# Patient Record
Sex: FEMALE | Race: WHITE | Employment: FULL TIME | URBAN - METROPOLITAN AREA
[De-identification: names, ages, dates, MRNs, and addresses within clinical notes are randomized per-mention and may not be internally consistent; named-entity substitution may affect disease eponyms.]

---

## 2024-08-05 ENCOUNTER — OFFICE VISIT (OUTPATIENT)
Dept: ORTHOPEDIC SURGERY | Age: 24
End: 2024-08-05
Payer: COMMERCIAL

## 2024-08-05 VITALS — WEIGHT: 200 LBS | HEIGHT: 63 IN | BODY MASS INDEX: 35.44 KG/M2

## 2024-08-05 DIAGNOSIS — M25.561 RIGHT KNEE PAIN, UNSPECIFIED CHRONICITY: Primary | ICD-10-CM

## 2024-08-05 PROCEDURE — 99204 OFFICE O/P NEW MOD 45 MIN: CPT | Performed by: ORTHOPAEDIC SURGERY

## 2024-08-11 ENCOUNTER — HOSPITAL ENCOUNTER (OUTPATIENT)
Dept: MRI IMAGING | Age: 24
Discharge: HOME | End: 2024-08-11
Attending: ORTHOPAEDIC SURGERY
Payer: COMMERCIAL

## 2024-08-11 DIAGNOSIS — M25.561 RIGHT KNEE PAIN, UNSPECIFIED CHRONICITY: ICD-10-CM

## 2024-08-11 PROCEDURE — 73721 MRI JNT OF LWR EXTRE W/O DYE: CPT

## 2024-08-14 NOTE — PROGRESS NOTES
8/5/2024     Reason for visit:  Right knee pain    History of Present Illness:  Patient is a 24-year-old female who presents for evaluation.  She reports she has injured herself in a few different occasions.  The first injury occurred years ago the most recent occurred while playing softball.  She does report twisting her knee.  She reports ongoing chronic pain with intermittent swelling.  Intermittent catching and locking.  Over the years she has been treated with activity modification, anti-inflammatory medications, physical therapy but has remained symptomatic.    Medical History:  No past medical history on file.   No past surgical history on file.   No family history on file.   Social History     Socioeconomic History    Marital status: Unknown     Spouse name: Not on file    Number of children: Not on file    Years of education: Not on file    Highest education level: Not on file   Occupational History    Not on file   Tobacco Use    Smoking status: Not on file    Smokeless tobacco: Not on file   Substance and Sexual Activity    Alcohol use: Not on file    Drug use: Not on file    Sexual activity: Not on file   Other Topics Concern    Not on file   Social History Narrative    Not on file     Social Determinants of Health     Financial Resource Strain: Not on file   Food Insecurity: Unknown (1/18/2024)    Received from [facility]     Food Insecurities     Worried about running out of food: Not on file     Food Bought: Not on file   Transportation Needs: Unknown (1/18/2024)    Received from [facility]     Transportation     Worried about transportation: Not on file   Physical Activity: Not on file   Stress: Not on file   Social Connections: Not on file   Intimate Partner Violence: Unknown (1/18/2024)    Received from [facility]     Interpersonal Safety     Feel physically or emotionally unsafe where currently live: Not on file     Harm by anyone: Not on file     Emotionally

## 2024-08-26 ENCOUNTER — OFFICE VISIT (OUTPATIENT)
Dept: ORTHOPEDIC SURGERY | Age: 24
End: 2024-08-26
Payer: COMMERCIAL

## 2024-08-26 VITALS — WEIGHT: 200 LBS | HEIGHT: 63 IN | BODY MASS INDEX: 35.44 KG/M2

## 2024-08-26 DIAGNOSIS — M25.561 RIGHT KNEE PAIN, UNSPECIFIED CHRONICITY: Primary | ICD-10-CM

## 2024-08-26 PROCEDURE — 99214 OFFICE O/P EST MOD 30 MIN: CPT | Performed by: ORTHOPAEDIC SURGERY

## 2024-08-26 RX ORDER — METHYLPREDNISOLONE 4 MG
TABLET, DOSE PACK ORAL
Qty: 1 KIT | Refills: 0 | Status: SHIPPED | OUTPATIENT
Start: 2024-08-26

## 2024-08-28 NOTE — PROGRESS NOTES
2024     Reason for visit:  Right knee pain    History of Present Illness:  Patient is a 24-year-old female who presents for evaluation.  She reports she has injured herself in a few different occasions.  The first injury occurred years ago the most recent occurred while playing softball.  She does report twisting her knee.  She reports ongoing chronic pain with intermittent swelling.  Intermittent catching and locking.  Over the years she has been treated with activity modification, anti-inflammatory medications, physical therapy but has remained symptomatic.    Objective:  Ht 1.6 m (5' 3\")   Wt 90.7 kg (200 lb)   BMI 35.43 kg/m²      Physical Exam:  The patient is well-appearing and in no apparent distress  Examination of the right knee   There is a small effusion, no gross deformity or skin changes  Range of motion reveals 0 to 125  neg lachman, negative posterior drawer, no pain or laxity with varus or valgus stress at 0 degrees and 30 degrees of flexion  + joint line tenderness  Tenderness over the distal quadriceps tendon as well as the patellar tendon  5 out of 5 strength throughout distal muscle groups  Sensation is intact to light touch throughout all distributions  There is no calf swelling or tenderness  Palpable DP pulse, brisk cap refill, 2+ symmetric reflexes     Imagin view x-rays of the right knee were obtained the office today on 2024 reviewed.  No fracture or dislocation.  No other abnormality.  MRI of the right knee was reviewed.  No abnormalities noted.      Assessment:  Right knee pain following injury.  Suspect distal quadriceps and patellar tendinosis    Plan:  I discussed with the patient the diagnosis and treatment options.  We discussed operative and nonoperative management.  At this point I do recommend nonoperative management.  Nonoperative treatment options include activity modification, anti-inflammatory medications, physical therapy, and injections.  We have  initiated a Medrol Dosepak followed by diclofenac.  Initiate physical therapy focus on eccentric patellar tendon exercises.  Return in 6 weeks.    Greater than 30 minutes were spent with this encounter.  Time spent included evaluating the patient's chart prior to arrival.  Evaluating the patient in the office including history, physical examination, imaging reviewing, and counseling on next steps.  Lastly, time was spent discussing orders with my staff as well as providing documentation in the chart.                    Brendan Jones MD            Orthopaedic Surgery Sports Medicine and Arthroscopy            [facility]            Team Physician [facility]      Disclaimer:  This note was dictated with voice recognition software.  Though review and correction are routine, we apologize for any errors.